# Patient Record
Sex: MALE | Race: WHITE
[De-identification: names, ages, dates, MRNs, and addresses within clinical notes are randomized per-mention and may not be internally consistent; named-entity substitution may affect disease eponyms.]

---

## 2019-09-09 ENCOUNTER — HOSPITAL ENCOUNTER (EMERGENCY)
Dept: HOSPITAL 53 - M ED | Age: 19
Discharge: HOME | End: 2019-09-09
Payer: COMMERCIAL

## 2019-09-09 VITALS — SYSTOLIC BLOOD PRESSURE: 141 MMHG | DIASTOLIC BLOOD PRESSURE: 75 MMHG

## 2019-09-09 VITALS — WEIGHT: 178.38 LBS | BODY MASS INDEX: 24.16 KG/M2 | HEIGHT: 72 IN

## 2019-09-09 DIAGNOSIS — Z88.8: ICD-10-CM

## 2019-09-09 DIAGNOSIS — M25.552: ICD-10-CM

## 2019-09-09 DIAGNOSIS — M25.551: Primary | ICD-10-CM

## 2019-09-10 NOTE — REP
Pelvis right hip:  Three views.

 

History:  Pain.

 

Findings:  AP and frog-leg views of the right hip and AP view of the pelvis are

presented.  Bony pelvic ring is intact.  Sacrum is intact.  SI joints and

symphysis pubis are normally aligned.  Femoral heads are smooth and rounded and

hip joint spaces are preserved.  There is no evidence of fracture or

subluxation.

 

Impression:

 

Negative AP pelvis and right hip radiographs.

 

 

Electronically Signed by

Kobe Pavon MD 09/10/2019 08:17 A

## 2020-01-10 ENCOUNTER — HOSPITAL ENCOUNTER (INPATIENT)
Dept: HOSPITAL 53 - M ED | Age: 20
LOS: 6 days | Discharge: HOME | DRG: 885 | End: 2020-01-16
Attending: PSYCHIATRY & NEUROLOGY | Admitting: PSYCHIATRY & NEUROLOGY
Payer: COMMERCIAL

## 2020-01-10 VITALS — WEIGHT: 181.44 LBS | BODY MASS INDEX: 24.58 KG/M2 | HEIGHT: 72 IN

## 2020-01-10 VITALS — DIASTOLIC BLOOD PRESSURE: 60 MMHG | SYSTOLIC BLOOD PRESSURE: 133 MMHG

## 2020-01-10 DIAGNOSIS — F43.23: ICD-10-CM

## 2020-01-10 DIAGNOSIS — Z88.8: ICD-10-CM

## 2020-01-10 DIAGNOSIS — F33.2: Primary | ICD-10-CM

## 2020-01-10 LAB
ALBUMIN SERPL BCG-MCNC: 4.4 GM/DL (ref 3.2–5.2)
ALT SERPL W P-5'-P-CCNC: 21 U/L (ref 12–78)
AMPHETAMINES UR QL SCN: NEGATIVE
APAP SERPL-MCNC: < 2 UG/ML (ref 10–30)
BARBITURATES UR QL SCN: NEGATIVE
BENZODIAZ UR QL SCN: NEGATIVE
BILIRUB CONJ SERPL-MCNC: 0.2 MG/DL (ref 0–0.2)
BILIRUB SERPL-MCNC: 0.6 MG/DL (ref 0.2–1)
BUN SERPL-MCNC: 16 MG/DL (ref 7–18)
BZE UR QL SCN: NEGATIVE
CALCIUM SERPL-MCNC: 9.3 MG/DL (ref 8.5–10.1)
CANNABINOIDS UR QL SCN: NEGATIVE
CHLORIDE SERPL-SCNC: 105 MEQ/L (ref 98–107)
CO2 SERPL-SCNC: 31 MEQ/L (ref 21–32)
CREAT SERPL-MCNC: 1.04 MG/DL (ref 0.7–1.3)
ETHANOL SERPL-MCNC: 0 % (ref 0–0.01)
GLUCOSE SERPL-MCNC: 72 MG/DL (ref 70–100)
HCT VFR BLD AUTO: 47.2 % (ref 42–52)
HGB BLD-MCNC: 15.6 G/DL (ref 13.5–17.5)
MCH RBC QN AUTO: 30.1 PG (ref 27–33)
MCHC RBC AUTO-ENTMCNC: 33.1 G/DL (ref 32–36.5)
MCV RBC AUTO: 90.9 FL (ref 80–96)
METHADONE UR QL SCN: NEGATIVE
OPIATES UR QL SCN: NEGATIVE
PCP UR QL SCN: NEGATIVE
PLATELET # BLD AUTO: 201 10^3/UL (ref 150–450)
POTASSIUM SERPL-SCNC: 4.3 MEQ/L (ref 3.5–5.1)
PROT SERPL-MCNC: 7.9 GM/DL (ref 6.4–8.2)
RBC # BLD AUTO: 5.19 10^6/UL (ref 4.3–6.1)
SALICYLATES SERPL-MCNC: < 1.7 MG/DL (ref 5–30)
SODIUM SERPL-SCNC: 141 MEQ/L (ref 136–145)
TSH SERPL DL<=0.005 MIU/L-ACNC: 0.72 UIU/ML (ref 0.46–3.98)
WBC # BLD AUTO: 4.9 10^3/UL (ref 4–10)

## 2020-01-11 VITALS — SYSTOLIC BLOOD PRESSURE: 128 MMHG | DIASTOLIC BLOOD PRESSURE: 64 MMHG

## 2020-01-11 VITALS — SYSTOLIC BLOOD PRESSURE: 132 MMHG | DIASTOLIC BLOOD PRESSURE: 74 MMHG

## 2020-01-11 RX ADMIN — SERTRALINE HYDROCHLORIDE SCH MG: 50 TABLET ORAL at 15:47

## 2020-01-11 NOTE — HPEPDOC
General


Date of Admission


Troy 10, 2020 at 16:46


Date of Service:  Jan 11, 2020


Chief Complaint


The patient is a 19-year-old male admitted with a reason for visit of 

Unspecified Depression Do.





History of Present Illness


19 year old male presents to [facility] with suicidal ideation and depression. Patient

seen and examined today, denies cp/pressure, n/v/d, fever/chills, headaches, 

shortness of breath, abdominal pain, urinary complaints. No specific complaints 

voiced.





Home Medications


No Active Prescriptions or Reported Meds





Allergies


Coded Allergies:  


     pseudoephedrine (Verified  Allergy, Intermediate, chest pain, dizziness, 

difficulty in breathing., 9/9/19)


     lactose (Verified  Allergy, Mild, 1/10/20)


   


   PT REPORTS BEING LACTOSE INTOLERANT


Uncoded Allergies:  


     vivance (Adverse Reaction, Unknown, mild, 9/9/19)





Past Medical History


Medical History


depression


Surgical History


wisdom teeth removal





Family History


Significant Family History:  No pertinent family hx





Social History


* Smoker:  Denies


Alcohol:  Denies


Drugs:  denies





A-FIB/CHADSVASC


A-FIB History


Current/History of A-Fib/PAF?:  No


Current PO Anticoag Therapy:  No





Review of Systems


Constitutional:  Denies: Chills, Fever, Night Sweats


Eyes:  Denies: Pain, Vision change


ENT:  Denies: Head Aches, Ear Pain, Dysphagia


Skin:  Denies: Rash, Lesions, Breakdown


Pulmonary:  Denies: Dyspnea, Cough


Cardiovascular:  Denies: Chest Pain, Palpitations, Orthopnea, Paroxysmal Noc. 

Dyspnea, Lt Headedness


Gastrointestinal:  Denies: Nausea, Vomiting, Abdominal Pain, Diarrhea


Genitourinary:  Denies: Dysuria, Frequency, Incontinence, Retention


Hematologic:  Denies: Bruising, Bleeding Excessively


Musculoskeletal:  Denies: Neck Pain, Back Pain, Joint Pain, Muscle Pain, Spasms


Neurological:  Denies: Weakness, Numbness, Change in speech, Confusion


Psych:  Reports: Mood Normal; 


   Denies: Depression, Memory Issues





Physical Examination


General Exam:  Positive: Alert, No Acute Distress


Eye Exam:  Positive: PERRLA, Conjunctiva & lids normal, EOMI; 


   Negative: Sclera icteric


ENT Exam:  Positive: Atraumatic, Mucous membr. moist/pink, Pharynx Normal


Neck Exam:  Positive: Supple; 


   Negative: JVD, thyromegaly


Chest Exam:  Positive: Clear to auscultation, Normal air movement


Heart Exam:  Positive: Rate Normal, Regular Rhythm, Normal S1, Normal S2; 


   Negative: Murmurs, Rubs


Telemetry:  Positive: No significant arrhythmia


Abdomen Exam:  Positive: Normal bowel sounds, Soft; 


   Negative: Tenderness, Hepatospenomegaly


Extremity Exam:  Positive: Normal pulses; 


   Negative: Clubbing, Cyanosis, Edema


Skin Exam:  Positive: Nl turgor and temperature; 


   Negative: Breakdown, Lesion


Neuro Exam:  Positive: Normal Gait, Normal Speech, Cranial Nerves 3-12 NL, 

Reflexes 2+


Psych Exam:  Positive: Mental status NL, Mood NL, Oriented x 3





Vital Signs





Vital Signs








  Date Time  Temp Pulse Resp B/P (MAP) Pulse Ox O2 Delivery O2 Flow Rate FiO2


 


1/11/20 10:14      Room Air  


 


1/11/20 06:35 98.3 62 12 128/64 (85)    


 


1/10/20 17:22     99   











 Assessment/Plan


1. depression/suicidal ideations


- management as per psych.





No further medical management needed at this time, please call back with 

questions or concerns.





Plan / VTE


VTE Prophylaxis Ordered?:  No











KILO OVIEDO MD              Jan 11, 2020 14:38
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
## 2020-01-12 VITALS — DIASTOLIC BLOOD PRESSURE: 69 MMHG | SYSTOLIC BLOOD PRESSURE: 138 MMHG

## 2020-01-12 VITALS — DIASTOLIC BLOOD PRESSURE: 78 MMHG | SYSTOLIC BLOOD PRESSURE: 126 MMHG

## 2020-01-12 RX ADMIN — SERTRALINE HYDROCHLORIDE SCH MG: 50 TABLET ORAL at 08:11

## 2020-01-12 NOTE — MHIPNPDOC
[facility] Progress Note


Progress Note


DATE OF SERVICE: 1/12/20





HISTORY:Patient is a 19 -year-old , AD, male, with no previous psych 

history who was brought to ED after seen at [facility] endorsing depression and SI.  P

t in the ED stated that he was forced to come to ED by PA at [facility] and that PA 

had wanted to start him on zoloft for his current symptoms.  Pt in the ED 

endorsed increasing depression with current suicidal thoughts with no plan other

than not wanting it to be messy if he did commit suicide, he denied though that 

he necessarily wanted to die.  He admitted to thinking for the past few days 

about giving some of his belongings away.  He is concerned about his currant 

chapter out of the  due to breaking HIPAA protocol as he has no supports

in his life, not plans, no place to go after.  Per ED he was guarded with poor 

eye contact.


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 

who was brought to ED after seen at [facility] endorsing depression and SI.  Pt in the

ED stated that he was forced to come to ED by PA at [facility] and that PA had wanted 

to start him on zoloft for his current symptoms.  Pt in the ED endorsed 

increasing depression with current suicidal thoughts with no plan other than not

wanting it to be messy if he did commit suicide, he denied though that he 

necessarily wanted to die.  He admitted to thinking for the past few days about 

giving some of his belongings away.  He is concerned about his currant chapter 

out of the  due to breaking HIPAA protocol as he has no supports in his 

life, not plans, no place to go after.  Per ED he was guarded with poor eye 

contact.





VITAL SIGNS: See below.





NEW TEST RESULTS: See below.





CURRENT MEDICATIONS: See below.





MENTAL STATUS EXAMINATION:


General Appearance:  well groomed, appears stated age, hospital scrubs/clothing


Build:  average


Demeanor:  more open and talking honestly


Eye Contact:  fair


Activity:  average


Behavior:  cooperative, pleasant


Speech:  clear, spontaneous, reg volume


Mood:  less depressed, less anxious


Mood


"better"


Affect:  less constricted, flat, congruent, less anxious


Thought Process:  logical/linear, less depressed, intact, more hopeful and 

future oriented


Thought Content (Delusions):  none reported, denies SI, HI, AVH (currently)


Thought Content (Other):  none reported, appropriate


Thought Content (Aggressive):  none reported


Perception (Hallucinations):  none reported


Perception (Other):  none reported


Cognition (Impairment of):  none reported


Cognition(Intelligence Est.):  average


Oriented:  Awake, Alert, Oriented times three


Insight:  poor


Judgment:  Poor


Psychosis:  Denies





DIAGNOSES:


Depression unspecified


R/O Major depressive d/o recurrent severe w/o psychosis


R/O adjustment d/o with depression and anxiety


 


ASSESSMENT:Pt seen and states that his mood is better.  States his anxiety is 

improving, but benadryl prn makes him very tired and caused him to sleep a lot 

yesterday so wants to try atarax which he had first been started on but do to 

MAR listing of lactose allergy he could not receive it.  Pt states he does not 

have an allergy to lactose and has only a minor lactose intolerance, is able to 

drink 2% milk.  Will call pharmacy to remove lactose allergy from pt's MAR and 

start atarax prn anxiety.  States he slept well last night.  Feels he is 

tolerating his zoloft and it's beneficial. Worried that zoloft will cause him to

have erectile dysfunction and explained to pt side effect more likely to occur 

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 

like himself.  He is attending groups and finding them helpful.  States he went 

to yoga this morning that he found "nice."   He likes tatoos and said he would 

like to get a tatoo of 3 little birds to keep him motivated toward his future 

and be hopeful.  He denies SI/HI, hallucinations, delusions.  Pt feels safe 

here.





MANAGEMENT PLAN: continue plan. Lactose allergy removed from mar as pt denies an

d now can start atarax prn anxiety


zoloft 50mg daily


atarax 25mg q4hr prn anxiety





TIME SPENT: 30 minutes.





Vital Signs





Vital Signs








  Date Time  Temp Pulse Resp B/P (MAP) Pulse Ox O2 Delivery O2 Flow Rate FiO2


 


1/12/20 06:41 96.9 80 12 126/78 (94)  Room Air  


 


1/10/20 17:22     99   











Current Medications





Current Medications








 Medications


  (Trade)  Dose


 Ordered  Sig/Kailey


 Route


 PRN Reason  Start Time


 Stop Time Status Last Admin


Dose Admin


 


 Acetaminophen


  (Tylenol Tab)  650 mg  Q6HP  PRN


 PO


 HEADACHE or DISCOMFORT  1/10/20 17:00


     





 


 Al Hydrox/Mg


 Hydrox/Simethicone


  (Mylanta)  30 ml  Q4HP  PRN


 PO


 HEARTBURN/INDIGESTION  1/10/20 17:00


     





 


 Diphenhydramine


 HCl


  (Benadryl)  50 mg  Q4HP  PRN


 PO


 anxiety  1/11/20 16:15


     





 


 Home Med


  (Med Rec


 Complete!)    ASDIRECTED


 XX


   1/10/20 17:00


 1/10/20 16:52 DC  





 


 Magnesium


 Hydroxide


  (Milk Of


 Magnesia)  30 ml  DAILYPRN  PRN


 PO


 CONSTIPATION  1/10/20 17:00


     





 


 Nicotine


  (Nicoderm Cq


 21mg)  1 patch  DAILY


 TD


   1/11/20 09:00


 1/10/20 18:08 DC  





 


 Sertraline HCl


  (Zoloft)  50 mg  DAILY


 PO


   1/11/20 15:00


    1/12/20 08:11





 


 Trazodone HCl


  (Desyrel)  50 mg  QHSP  PRN


 PO


 INSOMNIA  1/10/20 17:00


    1/11/20 22:03














Allergies


Coded Allergies:  


     pseudoephedrine (Verified  Allergy, Intermediate, chest pain, dizziness, 

difficulty in breathing., 9/9/19)


     lactose (Verified  Adverse Reaction, Mild, 1/11/20)


   


   PT REPORTS BEING LACTOSE INTOLERANT


     lisdexamfetamine (Verified  Adverse Reaction, Mild, 1/11/20)











NEW PEDERSEN DO           Jan 12, 2020 09:20

## 2020-01-13 VITALS — DIASTOLIC BLOOD PRESSURE: 85 MMHG | SYSTOLIC BLOOD PRESSURE: 137 MMHG

## 2020-01-13 VITALS — SYSTOLIC BLOOD PRESSURE: 131 MMHG | DIASTOLIC BLOOD PRESSURE: 77 MMHG

## 2020-01-13 RX ADMIN — SERTRALINE HYDROCHLORIDE SCH MG: 50 TABLET ORAL at 09:01

## 2020-01-13 RX ADMIN — PROPRANOLOL HYDROCHLORIDE SCH MG: 10 TABLET ORAL at 20:38

## 2020-01-13 RX ADMIN — PROPRANOLOL HYDROCHLORIDE SCH MG: 10 TABLET ORAL at 16:00

## 2020-01-13 NOTE — MHIPNPDOC
[facility] Progress Note


Progress Note


DATE OF SERVICE: 1/13/20





HISTORY: Patient is a 19 -year-old , AD, male, with no previous psych 

history who was brought to ED after seen at [facility] endorsing depression and SI.  

Pt in the ED stated that he was forced to come to ED by PA at [facility] and that PA 

had wanted to start him on zoloft for his current symptoms.  Pt in the ED 

endorsed increasing depression with current suicidal thoughts with no plan other

than not wanting it to be messy if he did commit suicide, he denied though that 

he necessarily wanted to die.  He admitted to thinking for the past few days 

about giving some of his belongings away.  He is concerned about his currant 

chapter out of the  due to breaking HIPAA protocol as he has no supports

in his life, not plans, no place to go after.  Per ED he was guarded with poor 

eye contact.


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 

who was brought to ED after seen at [facility] endorsing depression and SI.  Pt in the

ED stated that he was forced to come to ED by PA at [facility] and that PA had wanted 

to start him on zoloft for his current symptoms.  Pt in the ED endorsed 

increasing depression with current suicidal thoughts with no plan other than not

wanting it to be messy if he did commit suicide, he denied though that he 

necessarily wanted to die.  He admitted to thinking for the past few days about 

giving some of his belongings away.  He is concerned about his currant chapter 

out of the  due to breaking HIPAA protocol as he has no supports in his 

life, not plans, no place to go after.  Per ED he was guarded with poor eye 

contact.





VITAL SIGNS: See below.





NEW TEST RESULTS: See below.





CURRENT MEDICATIONS: See below.





MENTAL STATUS EXAMINATION:


General Appearance:  well groomed, appears stated age, hospital scrubs/clothing


Build:  average


Demeanor:  more open and talking honestly


Eye Contact:  fair


Activity:  average


Behavior:  cooperative, pleasant


Speech:  clear, spontaneous, reg volume


Mood:   depressed, more anxious


Mood


"better"


Affect:  constricted, flat, congruent, more anxious


Thought Process:  logical/linear, depressed, intact, somewhat hopeful and future

oriented


Thought Content (Delusions):  none reported, denies SI, HI, AVH (currently)


Thought Content (Other):  none reported, appropriate


Thought Content (Aggressive):  none reported


Perception (Hallucinations):  none reported


Perception (Other):  none reported


Cognition (Impairment of):  none reported


Cognition(Intelligence Est.):  average


Oriented:  Awake, Alert, Oriented times three


Insight:  poor


Judgment:  Poor


Psychosis:  Denies





DIAGNOSES:


Depression unspecified


R/O Major depressive d/o recurrent severe w/o psychosis


R/O adjustment d/o with depression and anxiety


 


ASSESSMENT:Pt seen and states that his mood is "anxious".  States his anxiety is

worse today as atarax made it worse causing him to want to bang his head on the 

wall yesterday and wants to stop it.  Agreeable that should stop atarax due to 

what appears to be a paradoxical effect.  Discussed starting inderal for anxiety

with pt and he is agreeable after discussing risks/benefits.  He does appear 

more anxious today and fidgety/restless.  States he slept well last night.  

Feels he is tolerating his zoloft and it's beneficial. He is attending groups 

and finding them helpful.  He likes tatoos and said he would like to get a tatoo

of 3 little birds to keep him motivated toward his future and be hopeful.  He 

denies SI/HI, hallucinations, delusions.  Pt feels safe here.





MANAGEMENT PLAN: continue plan. d/c atarax and start inderal for anxiety


zoloft 50mg daily


inderal 10mg tid





TIME SPENT: 30 minutes.





Vital Signs





Vital Signs








  Date Time  Temp Pulse Resp B/P (MAP) Pulse Ox O2 Delivery O2 Flow Rate FiO2


 


1/13/20 06:43 97.7 82 12 131/77 (95)  Room Air  


 


1/10/20 17:22     99   











Current Medications





Current Medications








 Medications


  (Trade)  Dose


 Ordered  Sig/Kailey


 Route


 PRN Reason  Start Time


 Stop Time Status Last Admin


Dose Admin


 


 Acetaminophen


  (Tylenol Tab)  650 mg  Q6HP  PRN


 PO


 HEADACHE or DISCOMFORT  1/10/20 17:00


     





 


 Al Hydrox/Mg


 Hydrox/Simethicone


  (Mylanta)  30 ml  Q4HP  PRN


 PO


 HEARTBURN/INDIGESTION  1/10/20 17:00


     





 


 Diphenhydramine


 HCl


  (Benadryl)  50 mg  Q4HP  PRN


 PO


 anxiety  1/11/20 16:15


   Cancel  





 


 Home Med


  (Med Rec


 Complete!)    ASDIRECTED


 XX


   1/10/20 17:00


 1/10/20 16:52 DC  





 


 Hydroxyzine HCl


  (Atarax)  25 mg  Q4HP  PRN


 PO


 ANXIETY/AGITATION  1/12/20 09:15


    1/12/20 10:33





 


 Magnesium


 Hydroxide


  (Milk Of


 Magnesia)  30 ml  DAILYPRN  PRN


 PO


 CONSTIPATION  1/10/20 17:00


     





 


 Nicotine


  (Nicoderm Cq


 21mg)  1 patch  DAILY


 TD


   1/11/20 09:00


 1/10/20 18:08 DC  





 


 Sertraline HCl


  (Zoloft)  50 mg  DAILY


 PO


   1/11/20 15:00


    1/13/20 09:01





 


 Trazodone HCl


  (Desyrel)  50 mg  QHSP  PRN


 PO


 INSOMNIA  1/10/20 17:00


    1/12/20 21:50














Allergies


Coded Allergies:  


     pseudoephedrine (Verified  Allergy, Intermediate, chest pain, dizziness, 

difficulty in breathing., 9/9/19)


     lisdexamfetamine (Verified  Adverse Reaction, Mild, 1/11/20)











NEW PEDERSEN DO           Jan 13, 2020 09:32

## 2020-01-14 VITALS — SYSTOLIC BLOOD PRESSURE: 119 MMHG | DIASTOLIC BLOOD PRESSURE: 57 MMHG

## 2020-01-14 VITALS — SYSTOLIC BLOOD PRESSURE: 125 MMHG | DIASTOLIC BLOOD PRESSURE: 67 MMHG

## 2020-01-14 RX ADMIN — ACETAMINOPHEN PRN MG: 325 TABLET ORAL at 21:08

## 2020-01-14 RX ADMIN — SERTRALINE HYDROCHLORIDE SCH MG: 50 TABLET ORAL at 08:30

## 2020-01-14 RX ADMIN — PROPRANOLOL HYDROCHLORIDE SCH MG: 10 TABLET ORAL at 08:30

## 2020-01-14 RX ADMIN — PROPRANOLOL HYDROCHLORIDE SCH MG: 10 TABLET ORAL at 21:08

## 2020-01-14 NOTE — MHIPNPDOC
[facility] Progress Note


Progress Note


DATE OF SERVICE: 1/14/20





HISTORY: Patient is a 19 -year-old , AD, male, with no previous psych 

history who was brought to ED after seen at [facility] endorsing depression and SI.  

Pt in the ED stated that he was forced to come to ED by PA at [facility] and that PA 

had wanted to start him on zoloft for his current symptoms.  Pt in the ED 

endorsed increasing depression with current suicidal thoughts with no plan other

than not wanting it to be messy if he did commit suicide, he denied though that 

he necessarily wanted to die.  He admitted to thinking for the past few days 

about giving some of his belongings away.  He is concerned about his currant 

chapter out of the  due to breaking HIPAA protocol as he has no supports

in his life, not plans, no place to go after.  Per ED he was guarded with poor 

eye contact.


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 

who was brought to ED after seen at [facility] endorsing depression and SI.  Pt in the

ED stated that he was forced to come to ED by PA at [facility] and that PA had wanted 

to start him on zoloft for his current symptoms.  Pt in the ED endorsed 

increasing depression with current suicidal thoughts with no plan other than not

wanting it to be messy if he did commit suicide, he denied though that he 

necessarily wanted to die.  He admitted to thinking for the past few days about 

giving some of his belongings away.  He is concerned about his currant chapter 

out of the  due to breaking HIPAA protocol as he has no supports in his 

life, not plans, no place to go after.  Per ED he was guarded with poor eye 

contact.





VITAL SIGNS: See below.





NEW TEST RESULTS: See below.





CURRENT MEDICATIONS: See below.





MENTAL STATUS EXAMINATION:


General Appearance:  well groomed, appears stated age, hospital scrubs/clothing


Build:  average


Demeanor:  more open and talking honestly


Eye Contact:  fair


Activity:  average


Behavior:  cooperative, pleasant


Speech:  clear, spontaneous, reg volume


Mood:   less depressed, less anxious


Mood


"better"


Affect:  constricted, flat, congruent, less anxious


Thought Process:  logical/linear, less depressed, intact, somewhat hopeful and 

future oriented


Thought Content (Delusions):  none reported, denies SI, HI, AVH (currently)


Thought Content (Other):  none reported, appropriate


Thought Content (Aggressive):  none reported


Perception (Hallucinations):  none reported


Perception (Other):  none reported


Cognition (Impairment of):  none reported


Cognition(Intelligence Est.):  average


Oriented:  Awake, Alert, Oriented times three


Insight:  poor


Judgment:  Poor


Psychosis:  Denies





DIAGNOSES:


Depression unspecified


R/O Major depressive d/o recurrent severe w/o psychosis


R/O adjustment d/o with depression and anxiety


 


ASSESSMENT:Pt seen and states that his mood is "better" today.  States he's 

finding inderal very beneficial for his anxiety and is tolerating it well.  

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  Encouraged to speak with 

his Noel regarding his future in the .  Admits he's anxious to do that 

due to not feeling like the people "in charge of me" are not listening to him. 

He does appear more calm today.  States he slept well last night.  Feels he is 

tolerating his zoloft and it's beneficial. He is attending groups and finding 

them helpful.  He likes tatoos and said he would like to get a tatoo of 3 little

birds to keep him motivated toward his future and be hopeful.  He denies SI/HI, 

hallucinations, delusions.  Pt feels safe here.





MANAGEMENT PLAN: continue plan. d/c atarax and change inderal bid


zoloft 50mg daily


inderal 10mg bid





TIME SPENT: 30 minutes.





Vital Signs





Vital Signs








  Date Time  Temp Pulse Resp B/P (MAP) Pulse Ox O2 Delivery O2 Flow Rate FiO2


 


1/14/20 08:30  80  134/82    


 


1/14/20 07:04 97.8  12     


 


1/13/20 06:43      Room Air  


 


1/10/20 17:22     99   











Current Medications





Current Medications








 Medications


  (Trade)  Dose


 Ordered  Sig/Kailey


 Route


 PRN Reason  Start Time


 Stop Time Status Last Admin


Dose Admin


 


 Acetaminophen


  (Tylenol Tab)  650 mg  Q6HP  PRN


 PO


 HEADACHE or DISCOMFORT  1/10/20 17:00


     





 


 Al Hydrox/Mg


 Hydrox/Simethicone


  (Mylanta)  30 ml  Q4HP  PRN


 PO


 HEARTBURN/INDIGESTION  1/10/20 17:00


     





 


 Diphenhydramine


 HCl


  (Benadryl)  50 mg  Q4HP  PRN


 PO


 anxiety  1/11/20 16:15


   Cancel  





 


 Home Med


  (Med Rec


 Complete!)    ASDIRECTED


 XX


   1/10/20 17:00


 1/10/20 16:52 DC  





 


 Hydroxyzine HCl


  (Atarax)  25 mg  Q4HP  PRN


 PO


 ANXIETY/AGITATION  1/12/20 09:15


 1/13/20 10:50 DC 1/12/20 10:33





 


 Magnesium


 Hydroxide


  (Milk Of


 Magnesia)  30 ml  DAILYPRN  PRN


 PO


 CONSTIPATION  1/10/20 17:00


     





 


 Nicotine


  (Nicoderm Cq


 21mg)  1 patch  DAILY


 TD


   1/11/20 09:00


 1/10/20 18:08 DC  





 


 Propranolol HCl


  (Inderal)  10 mg  TID


 PO


   1/13/20 16:00


    1/14/20 08:30





 


 Sertraline HCl


  (Zoloft)  50 mg  DAILY


 PO


   1/11/20 15:00


    1/14/20 08:30





 


 Trazodone HCl


  (Desyrel)  50 mg  QHSP  PRN


 PO


 INSOMNIA  1/10/20 17:00


    1/13/20 22:33














Allergies


Coded Allergies:  


     pseudoephedrine (Verified  Allergy, Intermediate, chest pain, dizziness, 

difficulty in breathing., 9/9/19)


     lisdexamfetamine (Verified  Adverse Reaction, Mild, 1/11/20)











NEW PEDERSEN DO          Jan 14, 2020 8:59 am

## 2020-01-15 VITALS — DIASTOLIC BLOOD PRESSURE: 59 MMHG | SYSTOLIC BLOOD PRESSURE: 113 MMHG

## 2020-01-15 VITALS — DIASTOLIC BLOOD PRESSURE: 83 MMHG | SYSTOLIC BLOOD PRESSURE: 143 MMHG

## 2020-01-15 RX ADMIN — PROPRANOLOL HYDROCHLORIDE SCH MG: 10 TABLET ORAL at 21:09

## 2020-01-15 RX ADMIN — ACETAMINOPHEN PRN MG: 325 TABLET ORAL at 15:52

## 2020-01-15 RX ADMIN — PROPRANOLOL HYDROCHLORIDE SCH MG: 10 TABLET ORAL at 08:00

## 2020-01-15 RX ADMIN — SERTRALINE HYDROCHLORIDE SCH MG: 50 TABLET ORAL at 08:00

## 2020-01-15 NOTE — MHIPNPDOC
[facility] Progress Note


Progress Note


DATE OF SERVICE: 1/15/20





HISTORY: Patient is a 19 -year-old , AD, male, with no previous psych 

history who was brought to ED after seen at [facility] endorsing depression and SI.  

Pt in the ED stated that he was forced to come to ED by PA at [facility] and that PA 

had wanted to start him on zoloft for his current symptoms.  Pt in the ED 

endorsed increasing depression with current suicidal thoughts with no plan other

than not wanting it to be messy if he did commit suicide, he denied though that 

he necessarily wanted to die.  He admitted to thinking for the past few days 

about giving some of his belongings away.  He is concerned about his currant 

chapter out of the  due to breaking HIPAA protocol as he has no supports

in his life, not plans, no place to go after.  Per ED he was guarded with poor 

eye contact.


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 

who was brought to ED after seen at [facility] endorsing depression and SI.  Pt in the

ED stated that he was forced to come to ED by PA at [facility] and that PA had wanted 

to start him on zoloft for his current symptoms.  Pt in the ED endorsed 

increasing depression with current suicidal thoughts with no plan other than not

wanting it to be messy if he did commit suicide, he denied though that he 

necessarily wanted to die.  He admitted to thinking for the past few days about 

giving some of his belongings away.  He is concerned about his currant chapter 

out of the  due to breaking HIPAA protocol as he has no supports in his 

life, not plans, no place to go after.  Per ED he was guarded with poor eye 

contact.





VITAL SIGNS: See below.





NEW TEST RESULTS: See below.





CURRENT MEDICATIONS: See below.





MENTAL STATUS EXAMINATION:


General Appearance:  well groomed, appears stated age, hospital scrubs/clothing


Build:  average


Demeanor:  more open and talking honestly


Eye Contact:  fair


Activity:  average


Behavior:  cooperative, pleasant


Speech:  clear, spontaneous, reg volume


Mood:   less depressed, anxious


Mood


"better"


Affect:  constricted, congruent,  anxious


Thought Process:  logical/linear, less depressed, intact, somewhat hopeful and 

future oriented


Thought Content (Delusions):  none reported, denies SI, HI, AVH (currently)


Thought Content (Other):  none reported, appropriate


Thought Content (Aggressive):  none reported


Perception (Hallucinations):  none reported


Perception (Other):  none reported


Cognition (Impairment of):  none reported


Cognition(Intelligence Est.):  average


Oriented:  Awake, Alert, Oriented times three


Insight:  poor


Judgment:  Poor


Psychosis:  Denies





DIAGNOSES:


Depression unspecified


R/O Major depressive d/o recurrent severe w/o psychosis


R/O adjustment d/o with depression and anxiety


 


ASSESSMENT:Pt seen and states that his mood is "ok" today.  States he's finding 

inderal very beneficial for his anxiety and is tolerating it well.  States he 

didn't sleep that well due to anxiety regarding Noel this evening regarding his 

future in the .  States he hopes the meeting goes good.  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  Feels he is tolerating his zoloft and

it's beneficial but feels it needs to be increased for improved treatment of his

mood. He is attending groups and finding them helpful.  He denies SI/HI, 

hallucinations, delusions.  Pt feels safe here.





MANAGEMENT PLAN: continue plan. increase zoloft.  d/c planning tomorrow after pt

meets with his Noel this evening


zoloft 75mg daily


inderal 10mg bid





TIME SPENT: 30 minutes.





Vital Signs





Vital Signs








  Date Time  Temp Pulse Resp B/P (MAP) Pulse Ox O2 Delivery O2 Flow Rate FiO2


 


1/15/20 06:40 98.5 53 12 113/59 (77)    


 


1/13/20 06:43      Room Air  


 


1/10/20 17:22     99   











Current Medications





Current Medications








 Medications


  (Trade)  Dose


 Ordered  Sig/Kailey


 Route


 PRN Reason  Start Time


 Stop Time Status Last Admin


Dose Admin


 


 Acetaminophen


  (Tylenol Tab)  650 mg  Q6HP  PRN


 PO


 HEADACHE or DISCOMFORT  1/10/20 17:00


    1/14/20 21:08





 


 Al Hydrox/Mg


 Hydrox/Simethicone


  (Mylanta)  30 ml  Q4HP  PRN


 PO


 HEARTBURN/INDIGESTION  1/10/20 17:00


     





 


 Diphenhydramine


 HCl


  (Benadryl)  50 mg  Q4HP  PRN


 PO


 anxiety  1/11/20 16:15


   Cancel  





 


 Home Med


  (Med Rec


 Complete!)    ASDIRECTED


 XX


   1/10/20 17:00


 1/10/20 16:52 DC  





 


 Hydroxyzine HCl


  (Atarax)  25 mg  Q4HP  PRN


 PO


 ANXIETY/AGITATION  1/12/20 09:15


 1/13/20 10:50 DC 1/12/20 10:33





 


 Magnesium


 Hydroxide


  (Milk Of


 Magnesia)  30 ml  DAILYPRN  PRN


 PO


 CONSTIPATION  1/10/20 17:00


     





 


 Nicotine


  (Nicoderm Cq


 21mg)  1 patch  DAILY


 TD


   1/11/20 09:00


 1/10/20 18:08 DC  





 


 Propranolol HCl


  (Inderal)  10 mg  BID


 PO


   1/14/20 21:00


    1/15/20 08:00





 


 Propranolol HCl


  (Inderal)  10 mg  TID


 PO


   1/13/20 16:00


 1/14/20 09:01 DC 1/14/20 08:30





 


 Sertraline HCl


  (Zoloft)  50 mg  DAILY


 PO


   1/11/20 15:00


    1/15/20 08:00





 


 Trazodone HCl


  (Desyrel)  50 mg  QHSP  PRN


 PO


 INSOMNIA  1/10/20 17:00


    1/14/20 21:08














Allergies


Coded Allergies:  


     pseudoephedrine (Verified  Allergy, Intermediate, chest pain, dizziness, 

difficulty in breathing., 9/9/19)


     lisdexamfetamine (Verified  Adverse Reaction, Mild, 1/11/20)











NEW PEDERSEN DO          Troy 15, 2020 8:46 am

## 2020-01-16 VITALS — SYSTOLIC BLOOD PRESSURE: 130 MMHG | DIASTOLIC BLOOD PRESSURE: 68 MMHG

## 2020-01-16 VITALS — DIASTOLIC BLOOD PRESSURE: 82 MMHG | SYSTOLIC BLOOD PRESSURE: 139 MMHG

## 2020-01-16 RX ADMIN — PROPRANOLOL HYDROCHLORIDE SCH MG: 10 TABLET ORAL at 09:10

## 2020-01-16 NOTE — MHDSPDOC
[facility] Discharge Summary


Discharge Summary


DATE OF ADMISSION: Troy 10, 2020 at 4:46 pm 


DATE OF DISCHARGE: Jan 16, 2020





DISCHARGE DIAGNOSES:


Depression unspecified


R/O Major depressive d/o recurrent severe w/o psychosis


R/O adjustment d/o with depression and anxiety





REASON FOR ADMISSION:Patient is a 19 -year-old , AD, male, with no pre

vious psych history who was brought to ED after seen at [facility] endorsing 

depression and SI.  Pt in the ED stated that he was forced to come to ED by PA 

at [facility] and that PA had wanted to start him on zoloft for his current symptoms. 

Pt in the ED endorsed increasing depression with current suicidal thoughts with 

no plan other than not wanting it to be messy if he did commit suicide, he 

denied though that he necessarily wanted to die.  He admitted to thinking for 

the past few days about giving some of his belongings away.  He is concerned 

about his currant chapter out of the  due to breaking HIPAA protocol as 

he has no supports in his life, not plans, no place to go after.  Per ED he was 

guarded with poor eye contact.


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 

who was brought to ED after seen at [facility] endorsing depression and SI.  Pt in the

ED stated that he was forced to come to ED by PA at [facility] and that PA had wanted 

to start him on zoloft for his current symptoms.  Pt in the ED endorsed 

increasing depression with current suicidal thoughts with no plan other than not

wanting it to be messy if he did commit suicide, he denied though that he 

necessarily wanted to die.  He admitted to thinking for the past few days about 

giving some of his belongings away.  He is concerned about his currant chapter 

out of the  due to breaking HIPAA protocol as he has no supports in his 

life, not plans, no place to go after.  Per ED he was guarded with poor eye 

contact.





CONSULTANTS INVOLVED: none





TREATMENT AND PROGRESS ON THE UNIT : Pt was admitted to [facility], seen for 

psychiatric assessment and started on zoloft increased to 75mg daily for mood 

and inderal 10mg tid for anxiety.  He was provided  trazodone 50mg qhs prn 

insomnia.  Pt found his medications beneficial and tolerated them well.  His 

anxiety was greatly improved with inderal.  He attended groups daily during his 

stay.  His symptoms improved with treatment.  On day of discharge he denied 

depression, anxiety, insomnia, SI/HI, hallucinations, delusions.  He was 

discharged home after Noel meeting with follow-up at [facility].  He felt safe for 

discharge.





DISCHARGE ASSESSMENT: Pt seen and states that his mood is "good" today and he's 

looking forward to going home with his Noel today.  Pt met with his Noel yesterday

to discuss his future within the  which cause anxiety after that is 

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 

to stay in the .  States he's finding inderal very beneficial for his 

anxiety and is tolerating it well.  States he slept well last night.  States 

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 

admitted.  Feels he is tolerating his zoloft and it's beneficial. He is 

attending groups and finding them helpful.  He denies depression, anxiety, 

insomnia, SI/HI, hallucinations, delusions.  Pt feels safe here.





MENTAL STATUS EXAMINATION ON DISCHARGE: 


General Appearance:  well groomed, appears stated age, hospital scrubs/clothing


Build:  average


Demeanor:  more open and talking honestly


Eye Contact:  good


Activity:  average


Behavior:  cooperative, pleasant


Speech:  clear, spontaneous, reg volume


Mood:  euthymic, full range


Mood


"good"


Affect:  full range, congruent


Thought Process:  logical/linear,  intact, hopeful and future oriented


Thought Content (Delusions):  none reported, denies SI, HI, AVH (currently)


Thought Content (Other):  none reported, appropriate


Thought Content (Aggressive):  none reported


Perception (Hallucinations):  none reported


Perception (Other):  none reported


Cognition (Impairment of):  none reported


Cognition(Intelligence Est.):  average


Oriented:  Awake, Alert, Oriented times three


Insight:  good


Judgment:  good


Psychosis:  Denies





MEDICATIONS ON DISCHARGE:


zoloft 75mg daily


inderal 10mg tid


trazodone 50mg qhs prn insomnia





PLAN/FOLLOWUP ARRANGEMENTS: D/c home with Noel with follow-up at [facility].





The amount of time spent in the coordination of care for this patient was 

approximately 30 minutes.





Vital Signs/I&Os





Vital Signs








  Date Time  Temp Pulse Resp B/P (MAP) Pulse Ox O2 Delivery O2 Flow Rate FiO2


 


1/16/20 06:05 97.2 56 16 130/68 (88)    


 


1/13/20 06:43      Room Air  


 


1/10/20 17:22     99   











Medications


No Active Prescriptions or Reported Meds





Allergies


Coded Allergies:  


     pseudoephedrine (Verified  Allergy, Intermediate, chest pain, dizziness, 

difficulty in breathing., 9/9/19)


     lisdexamfetamine (Verified  Adverse Reaction, Mild, 1/11/20)











NEW PEDERSEN DO          Jan 16, 2020 8:59 am

## 2020-02-21 ENCOUNTER — HOSPITAL ENCOUNTER (EMERGENCY)
Dept: HOSPITAL 53 - M ED | Age: 20
Discharge: HOME | End: 2020-02-21
Payer: COMMERCIAL

## 2020-02-21 VITALS — BODY MASS INDEX: 24.72 KG/M2 | WEIGHT: 182.54 LBS | HEIGHT: 72 IN

## 2020-02-21 VITALS — SYSTOLIC BLOOD PRESSURE: 129 MMHG | DIASTOLIC BLOOD PRESSURE: 71 MMHG

## 2020-02-21 DIAGNOSIS — F41.9: ICD-10-CM

## 2020-02-21 DIAGNOSIS — Z88.8: ICD-10-CM

## 2020-02-21 DIAGNOSIS — Z87.820: ICD-10-CM

## 2020-02-21 DIAGNOSIS — S06.0X1A: Primary | ICD-10-CM

## 2020-02-21 DIAGNOSIS — F32.9: ICD-10-CM
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
## 2020-02-21 DIAGNOSIS — Z79.899: ICD-10-CM
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
## 2020-02-21 DIAGNOSIS — M54.2: ICD-10-CM

## 2020-02-21 DIAGNOSIS — R45.1: ICD-10-CM

## 2020-02-21 DIAGNOSIS — F50.9: ICD-10-CM
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
## 2020-02-21 DIAGNOSIS — H53.8: ICD-10-CM

## 2020-02-21 DIAGNOSIS — F17.200: ICD-10-CM
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
## 2020-02-21 NOTE — REP
Head CT without contrast:

 

History:  Injury in a fall with loss of consciousness.  Altered mental status.

 

Comparison study: No comparison study.

 

CT findings:  Bone window settings demonstrate an intact bony calvarium.  There

is no evidence of skull fracture or incidental bony calvarial lesion.  The

visualized paranasal sinuses appear clear.  No intraorbital abnormality is seen.

On soft tissue window setting images; the lateral, third, and fourth ventricles

are normal in size and position.  Gray-white differentiation pattern is normal

above and below the tentorium.  There are is no evidence of intracranial

hemorrhage.  No mass, edema, infarction, or midline shift is seen.  No

extra-axial fluid collection is appreciated.

 

Impression:

 

Negative noncontrast head CT.

 

 

Electronically Signed by

Kobe Pavon MD 02/21/2020 08:08 A